# Patient Record
Sex: MALE | Race: OTHER | NOT HISPANIC OR LATINO | URBAN - METROPOLITAN AREA
[De-identification: names, ages, dates, MRNs, and addresses within clinical notes are randomized per-mention and may not be internally consistent; named-entity substitution may affect disease eponyms.]

---

## 2022-12-29 VITALS
OXYGEN SATURATION: 95 % | HEIGHT: 71 IN | WEIGHT: 158.07 LBS | RESPIRATION RATE: 18 BRPM | TEMPERATURE: 98 F | DIASTOLIC BLOOD PRESSURE: 66 MMHG | SYSTOLIC BLOOD PRESSURE: 123 MMHG | HEART RATE: 59 BPM

## 2022-12-29 RX ORDER — CHLORHEXIDINE GLUCONATE 213 G/1000ML
1 SOLUTION TOPICAL ONCE
Refills: 0 | Status: DISCONTINUED | OUTPATIENT
Start: 2023-01-11 | End: 2023-01-25

## 2022-12-29 NOTE — H&P ADULT - NSICDXPASTMEDICALHX_GEN_ALL_CORE_FT
PAST MEDICAL HISTORY:  GERD (gastroesophageal reflux disease)     HLD (hyperlipidemia)     Smoker

## 2022-12-29 NOTE — H&P ADULT - NSICDXFAMILYHX_GEN_ALL_CORE_FT
FAMILY HISTORY:  Father  Still living? Unknown  Family history of premature CAD, Age at diagnosis: Age Unknown    Mother  Still living? Unknown  Family history of premature CAD, Age at diagnosis: Age Unknown

## 2022-12-29 NOTE — H&P ADULT - NSHPPHYSICALEXAM_GEN_ALL_CORE
PHYSICAL EXAM:  General: Awake and alert.  No acute distress.  Head: Normocephalic, atraumatic.    Eyes: PERRL.  EOMI.  No scleral icterus.  No conjunctival pallor.  Mouth: Moist MM.  No oropharyngeal exudates.    Neck: Supple.  Full range of motion.  No JVD.  No LAD.  No thyromegaly.  Trachea midline.  2+ carotid pulses bilaterally, no carotid bruit.  Heart: RRR.  Normal S1 and S2.  No murmurs, rubs, or gallops.  No LE edema b/l.   Lungs: Nonlabored breathing.  Good inspiratory effort.  CTAB.  No wheezes, crackles, or rhonchi.    Abdomen: BS+, soft, NT/ND.  No hepatomegaly.   Skin: Warm and dry.  No rashes.  Extremities: No cyanosis.  2+ peripheral pulses b/l.  Musculoskeletal: No joint deformities.  No spinal or paraspinal tenderness.  Vascular: 2+ radial pulses bilaterally. 2+ DP/PT pulses bilaterally. 2+ femoral pulses bilaterally, no femoral bruit.  Neuro: A&Ox3.  CN II-XII intact.  5/5 motor strength in UE and LE b/l.  Tactile sensation intact in UE and LE b/l.

## 2022-12-29 NOTE — H&P ADULT - HISTORY OF PRESENT ILLNESS
COVID:  Pharmacy:  Escort:    57M w/ PMHx _____, initially presented to Dr. Brumfield c/o __. He denies any __ CP, palpitations, dizziness, syncope, diaphoresis, fatigue, LE edema, SOB, POWELL, orthopnea, PND, N/V/D, abd pain, cough, congestion, fever, chills or recent sick contact.     NST 12/24/22: medium reversible defect of mod intensity in basal-mid inferior/inferolateral walls and small-medium reversible defect of mod intensity in mid-apical anterior/anteroseptal walls, LVEF normal.    In light of pts risk factors, CCS class __ anginal symptoms and abnormal NST, pt now presents to [facility] for recommended cardiac catheterization with possible intervention if clinically indicated.  COVID:  Pharmacy:  Escort:    **[facility] - OFFICE WILL FAX NOTE AND TTE ON 12/30/22**    57M w/ PMHx _____, initially presented to Dr. Brumfield c/o __. He denies any __ CP, palpitations, dizziness, syncope, diaphoresis, fatigue, LE edema, SOB, POWELL, orthopnea, PND, N/V/D, abd pain, cough, congestion, fever, chills or recent sick contact.     NST 12/24/22: medium reversible defect of mod intensity in basal-mid inferior/inferolateral walls and small-medium reversible defect of mod intensity in mid-apical anterior/anteroseptal walls, LVEF normal. TTE ___:    In light of pts risk factors, CCS class __ anginal symptoms and abnormal NST, pt now presents to [facility] for recommended cardiac catheterization with possible intervention if clinically indicated.  COVID:  Pharmacy:  Escort:    **[facility] - OFFICE WILL FAX NOTE AND TTE ON 1/6**    57M w/ PMHx _____, initially presented to Dr. Brumfield c/o __. He denies any __ CP, palpitations, dizziness, syncope, diaphoresis, fatigue, LE edema, SOB, POWELL, orthopnea, PND, N/V/D, abd pain, cough, congestion, fever, chills or recent sick contact. NST 12/24/22: medium reversible defect of mod intensity in basal-mid inferior/inferolateral walls and small-medium reversible defect of mod intensity in mid-apical anterior/anteroseptal walls, LVEF normal. TTE ___.  In light of pts risk factors, CCS class __ anginal symptoms and abnormal NST, pt now presents to [facility] for recommended cardiac catheterization with possible intervention if clinically indicated. COVID: (-) 1/6/23  Pharmacy:  Escort:    Spoke to patient via phone but patient very poor historian.     57M, active smoker (1PPD x40yrs) w/ FHx of CAD (father MI 58, mother MI 61) w/ PMHx HLD, GERD, and on blood thinner (pt unsure why he takes Eliquis; last dose of Eliquis 1/7/23), initially presented to Dr. Brumfield c/o several episodes of chest pain and SOB upon mild exertion. He denies any palpitations, dizziness, syncope, diaphoresis, fatigue, LE edema, POWELL, orthopnea, PND, N/V/D, abd pain, cough, congestion, fever, chills or recent sick contact. NST 12/24/22: medium reversible defect of mod intensity in basal-mid inferior/inferolateral walls and small-medium reversible defect of mod intensity in mid-apical anterior/anteroseptal walls, LVEF normal. Echo (per MD note) "essentially unremarkable and showed normal LVEF w/ no significant valvular abnormalities.   In light of pts risk factors, CCS class III anginal symptoms and abnormal NST, pt now presents to [facility] for recommended cardiac catheterization with possible intervention if clinically indicated. COVID-19 PCR: negative 1/6/23 (HIE)  Cardiologist: Dr. Ryan Brumfield  Pharmacy: [facility] ([address] [place] [address])  Escort:    58 y/o Male, active smoker (1PPD x40yrs) w/ FHx of CAD (father MI 58, mother MI 61) w/ PMHx of HLD, GERD, and on blood thinner (pt unsure why he takes Eliquis; last dose of Eliquis 1/7/23), initially presented to Dr. Brumfield c/o several episodes of chest pain and SOB upon mild exertion. He denies any palpitations, dizziness, syncope, diaphoresis, fatigue, LE edema, POWELL, orthopnea, PND, N/V/D, abd pain, cough, congestion, fever, chills or recent sick contact. NST 12/24/22: medium reversible defect of mod intensity in basal-mid inferior/inferolateral walls and small-medium reversible defect of mod intensity in mid-apical anterior/anteroseptal walls, LVEF normal. Echo (per MD note) "essentially unremarkable and showed normal LVEF w/ no significant valvular abnormalities." In light of pts risk factors, CCS class III anginal symptoms and abnormal NST, pt now presents to [facility] for recommended cardiac catheterization with possible intervention if clinically indicated. COVID-19 PCR: negative 1/6/23 (HIE)  Cardiologist: Dr. Ryan Brumfield  Pharmacy: [facility] ([address] [place] [address])  Escort: friend    56 y/o Male, POOR HISTORIAN/MEDICATION COMPLIANCE, active smoker (1PPD x40yrs) w/ FHx of CAD (father MI 58, mother MI 61) w/ PMHx of HLD, GERD, ?hx of PE or DVT (dx at [facility], refuses to provide paperwork; on Eliquis, last dose 1/7/23), initially presented to Dr. Brumfield c/o several episodes of chest pain and SOB upon mild exertion. He denies any palpitations, dizziness, syncope, diaphoresis, fatigue, LE edema, POWELL, orthopnea, PND, N/V/D, abd pain, cough, congestion, fever, chills or recent sick contact. NST 12/24/22: medium reversible defect of mod intensity in basal-mid inferior/inferolateral walls and small-medium reversible defect of mod intensity in mid-apical anterior/anteroseptal walls, LVEF normal. Echo (per MD note) "essentially unremarkable and showed normal LVEF w/ no significant valvular abnormalities." In light of pts risk factors, CCS class III anginal symptoms and abnormal NST, pt now presents to [facility] for recommended cardiac catheterization with possible intervention if clinically indicated. COVID-19 PCR: negative 1/6/23 (HIE)  Cardiologist: Dr. Ryan Brumfield  Pharmacy: [facility] ([address] [place] [address]) or [facility] ([address] [place] [phone])  Escort: friend    58 y/o Male, POOR HISTORIAN/MEDICATION COMPLIANCE, active smoker (1PPD x40yrs) w/ FHx of CAD (father MI 58, mother MI 61) w/ PMHx of HLD, GERD, ?hx of PE or DVT (dx at [facility], refuses to provide paperwork; on Eliquis, last dose 1/7/23), initially presented to Dr. Brumfield c/o several episodes of chest pain and SOB upon mild exertion. He denies any palpitations, dizziness, syncope, diaphoresis, fatigue, LE edema, POWELL, orthopnea, PND, N/V/D, abd pain, cough, congestion, fever, chills or recent sick contact. NST 12/24/22: medium reversible defect of mod intensity in basal-mid inferior/inferolateral walls and small-medium reversible defect of mod intensity in mid-apical anterior/anteroseptal walls, LVEF normal. Echo (per MD note) "essentially unremarkable and showed normal LVEF w/ no significant valvular abnormalities." In light of pts risk factors, CCS class III anginal symptoms and abnormal NST, pt now presents to [facility] for recommended cardiac catheterization with possible intervention if clinically indicated.

## 2022-12-29 NOTE — H&P ADULT - ASSESSMENT
58 y/o Male, active smoker (1PPD x40yrs) w/ FHx of CAD (father MI 58, mother MI 61) w/ PMHx of HLD, GERD, and on blood thinner (pt unsure why he takes Eliquis; last dose of Eliquis 1/7/23) presents to [facility] for cardiac catheterization with possible intervention if clinically indicated; in light of pts risk factors, CCS class III anginal symptoms and abnormal NST.    - ASA II, Mallampati III  - H/H __.   - Cr __. EF normal by echo. Euvolemic on exam. NS 250cc bolus followed by 75cc/hr x2 hrs per pre-hydration protocol  - Suitable candidate for moderate sedation  - Pt consented for cath and form placed in chart    Risks & benefits of procedure and alternative therapy have been explained to the patient including but not limited to: allergic reaction, bleeding with possible need for blood transfusion, infection, renal and vascular compromise, limb damage, arrhythmia, stroke, vessel dissection/perforation, myocardial infarction, and emergent CABG. Informed consent obtained at bedside and included in chart. 56 y/o Male, POOR HISTORIAN/MEDICATION COMPLIANCE, active smoker (1PPD x40yrs) w/ FHx of CAD (father MI 58, mother MI 61) w/ PMHx of HLD, GERD, ?hx of PE or DVT (dx at [facility], refuses to provide paperwork; on Eliquis, last dose 1/7/23), presents to [facility] for cardiac catheterization with possible intervention if clinically indicated; in light of pts risk factors, CCS class III anginal symptoms and abnormal NST.    - ASA II, Mallampati III  - H/H 14/43.8. Denies hematuria, BRBPR, melena. Will load w/ ASA 325mg x1 and Plavix 600mg x1  - Cr __. EF normal by echo. Euvolemic on exam. NS 250cc bolus followed by 75cc/hr x2 hrs per pre-hydration protocol  - Suitable candidate for moderate sedation  - Pt consented for cath and form placed in chart    Risks & benefits of procedure and alternative therapy have been explained to the patient including but not limited to: allergic reaction, bleeding with possible need for blood transfusion, infection, renal and vascular compromise, limb damage, arrhythmia, stroke, vessel dissection/perforation, myocardial infarction, and emergent CABG. Informed consent obtained at bedside and included in chart. 56 y/o Male, POOR HISTORIAN/MEDICATION COMPLIANCE, active smoker (1PPD x40yrs) w/ FHx of CAD (father MI 58, mother MI 61) w/ PMHx of HLD, GERD, ?hx of PE or DVT (dx at [facility], refuses to provide paperwork; on Eliquis, last dose 1/7/23), presents to [facility] for cardiac catheterization with possible intervention if clinically indicated; in light of pts risk factors, CCS class III anginal symptoms and abnormal NST.    - ASA II, Mallampati III  - H/H 14/43.8. Denies hematuria, BRBPR, melena. Will load w/ ASA 325mg x1 and Plavix 600mg x1  - Cr 0.71. EF normal by echo. Euvolemic on exam. NS 250cc bolus followed by 75cc/hr x2 hrs per pre-hydration protocol  - Suitable candidate for moderate sedation  - Pt consented for cath and form placed in chart    Risks & benefits of procedure and alternative therapy have been explained to the patient including but not limited to: allergic reaction, bleeding with possible need for blood transfusion, infection, renal and vascular compromise, limb damage, arrhythmia, stroke, vessel dissection/perforation, myocardial infarction, and emergent CABG. Informed consent obtained at bedside and included in chart.

## 2022-12-29 NOTE — H&P ADULT - NSHPLABSRESULTS_GEN_ALL_CORE
Labs:                        14.0   7.43  )-----------( 312      ( 11 Jan 2023 11:56 )             43.8       Blood Gas Venous:  pH: 7.43 | HCO3: 30 | pCO2: 45 | pO2: 49 | Lactate: x (01-11-23 @ 12:27)    EKG: Sinus arrhythmia at 63bpm w/ PVCs Labs:                        14.0   7.43  )-----------( 312      ( 11 Jan 2023 11:56 )             43.8     01-11    139  |  101  |  12  ----------------------------<  109<H>  4.0   |  28  |  0.71    Ca    9.1      11 Jan 2023 11:56  Mg     2.0     01-11    TPro  6.6  /  Alb  4.1  /  TBili  0.3  /  DBili  x   /  AST  16  /  ALT  18  /  AlkPhos  81  01-11    Blood Gas Venous:  pH: 7.43 | HCO3: 30 | pCO2: 45 | pO2: 49 | Lactate: x (01-11-23 @ 12:27)    EKG: Sinus arrhythmia at 63bpm w/ PVCs

## 2023-01-11 ENCOUNTER — OUTPATIENT (OUTPATIENT)
Dept: OUTPATIENT SERVICES | Facility: HOSPITAL | Age: 58
LOS: 1 days | Discharge: HOME | End: 2023-01-11
Payer: COMMERCIAL

## 2023-01-11 LAB
A1C WITH ESTIMATED AVERAGE GLUCOSE RESULT: 6 % — HIGH (ref 4–5.6)
ALBUMIN SERPL ELPH-MCNC: 4.1 G/DL — SIGNIFICANT CHANGE UP (ref 3.3–5)
ALP SERPL-CCNC: 81 U/L — SIGNIFICANT CHANGE UP (ref 40–120)
ALT FLD-CCNC: 18 U/L — SIGNIFICANT CHANGE UP (ref 10–45)
ANION GAP SERPL CALC-SCNC: 10 MMOL/L — SIGNIFICANT CHANGE UP (ref 5–17)
ANION GAP SERPL CALC-SCNC: 7 MMOL/L — SIGNIFICANT CHANGE UP (ref 5–17)
APTT BLD: 33.5 SEC — SIGNIFICANT CHANGE UP (ref 27.5–35.5)
AST SERPL-CCNC: 16 U/L — SIGNIFICANT CHANGE UP (ref 10–40)
BASE EXCESS BLDV CALC-SCNC: 4.7 MMOL/L — HIGH (ref -2–3)
BASOPHILS # BLD AUTO: 0.05 K/UL — SIGNIFICANT CHANGE UP (ref 0–0.2)
BASOPHILS NFR BLD AUTO: 0.7 % — SIGNIFICANT CHANGE UP (ref 0–2)
BILIRUB SERPL-MCNC: 0.3 MG/DL — SIGNIFICANT CHANGE UP (ref 0.2–1.2)
BUN SERPL-MCNC: 10 MG/DL — SIGNIFICANT CHANGE UP (ref 7–23)
BUN SERPL-MCNC: 12 MG/DL — SIGNIFICANT CHANGE UP (ref 7–23)
CA-I SERPL-SCNC: 1.19 MMOL/L — SIGNIFICANT CHANGE UP (ref 1.15–1.33)
CALCIUM SERPL-MCNC: 8.5 MG/DL — SIGNIFICANT CHANGE UP (ref 8.4–10.5)
CALCIUM SERPL-MCNC: 9.1 MG/DL — SIGNIFICANT CHANGE UP (ref 8.4–10.5)
CHLORIDE SERPL-SCNC: 101 MMOL/L — SIGNIFICANT CHANGE UP (ref 96–108)
CHLORIDE SERPL-SCNC: 104 MMOL/L — SIGNIFICANT CHANGE UP (ref 96–108)
CHOLEST SERPL-MCNC: 203 MG/DL — HIGH
CK MB CFR SERPL CALC: 1.2 NG/ML — SIGNIFICANT CHANGE UP (ref 0–6.7)
CK SERPL-CCNC: 43 U/L — SIGNIFICANT CHANGE UP (ref 30–200)
CO2 BLDV-SCNC: 31.3 MMOL/L — HIGH (ref 22–26)
CO2 SERPL-SCNC: 28 MMOL/L — SIGNIFICANT CHANGE UP (ref 22–31)
CO2 SERPL-SCNC: 29 MMOL/L — SIGNIFICANT CHANGE UP (ref 22–31)
COHGB MFR BLDV: 4.9 % — HIGH
CREAT SERPL-MCNC: 0.71 MG/DL — SIGNIFICANT CHANGE UP (ref 0.5–1.3)
CREAT SERPL-MCNC: 0.72 MG/DL — SIGNIFICANT CHANGE UP (ref 0.5–1.3)
EGFR: 107 ML/MIN/1.73M2 — SIGNIFICANT CHANGE UP
EGFR: 107 ML/MIN/1.73M2 — SIGNIFICANT CHANGE UP
EOSINOPHIL # BLD AUTO: 0.11 K/UL — SIGNIFICANT CHANGE UP (ref 0–0.5)
EOSINOPHIL NFR BLD AUTO: 1.5 % — SIGNIFICANT CHANGE UP (ref 0–6)
ESTIMATED AVERAGE GLUCOSE: 126 MG/DL — HIGH (ref 68–114)
GAS PNL BLDV: 138 MMOL/L — SIGNIFICANT CHANGE UP (ref 136–145)
GLUCOSE BLDV-MCNC: 107 MG/DL — HIGH (ref 70–99)
GLUCOSE SERPL-MCNC: 109 MG/DL — HIGH (ref 70–99)
GLUCOSE SERPL-MCNC: 92 MG/DL — SIGNIFICANT CHANGE UP (ref 70–99)
HCO3 BLDV-SCNC: 30 MMOL/L — HIGH (ref 22–29)
HCT VFR BLD CALC: 42 % — SIGNIFICANT CHANGE UP (ref 39–50)
HCT VFR BLD CALC: 43.8 % — SIGNIFICANT CHANGE UP (ref 39–50)
HCV AB S/CO SERPL IA: 0.04 S/CO — SIGNIFICANT CHANGE UP
HCV AB SERPL-IMP: SIGNIFICANT CHANGE UP
HDLC SERPL-MCNC: 44 MG/DL — SIGNIFICANT CHANGE UP
HGB BLD CALC-MCNC: 14.5 G/DL — SIGNIFICANT CHANGE UP (ref 12.6–17.4)
HGB BLD-MCNC: 13.6 G/DL — SIGNIFICANT CHANGE UP (ref 13–17)
HGB BLD-MCNC: 14 G/DL — SIGNIFICANT CHANGE UP (ref 13–17)
IMM GRANULOCYTES NFR BLD AUTO: 0.4 % — SIGNIFICANT CHANGE UP (ref 0–0.9)
INR BLD: 0.98 — SIGNIFICANT CHANGE UP (ref 0.88–1.16)
LIPID PNL WITH DIRECT LDL SERPL: 140 MG/DL — HIGH
LYMPHOCYTES # BLD AUTO: 1.91 K/UL — SIGNIFICANT CHANGE UP (ref 1–3.3)
LYMPHOCYTES # BLD AUTO: 25.7 % — SIGNIFICANT CHANGE UP (ref 13–44)
MAGNESIUM SERPL-MCNC: 2 MG/DL — SIGNIFICANT CHANGE UP (ref 1.6–2.6)
MAGNESIUM SERPL-MCNC: 2 MG/DL — SIGNIFICANT CHANGE UP (ref 1.6–2.6)
MCHC RBC-ENTMCNC: 27.3 PG — SIGNIFICANT CHANGE UP (ref 27–34)
MCHC RBC-ENTMCNC: 28.2 PG — SIGNIFICANT CHANGE UP (ref 27–34)
MCHC RBC-ENTMCNC: 32 GM/DL — SIGNIFICANT CHANGE UP (ref 32–36)
MCHC RBC-ENTMCNC: 32.4 GM/DL — SIGNIFICANT CHANGE UP (ref 32–36)
MCV RBC AUTO: 85.5 FL — SIGNIFICANT CHANGE UP (ref 80–100)
MCV RBC AUTO: 87 FL — SIGNIFICANT CHANGE UP (ref 80–100)
METHGB MFR BLDV: 0.5 % — SIGNIFICANT CHANGE UP
MONOCYTES # BLD AUTO: 0.74 K/UL — SIGNIFICANT CHANGE UP (ref 0–0.9)
MONOCYTES NFR BLD AUTO: 10 % — SIGNIFICANT CHANGE UP (ref 2–14)
NEUTROPHILS # BLD AUTO: 4.59 K/UL — SIGNIFICANT CHANGE UP (ref 1.8–7.4)
NEUTROPHILS NFR BLD AUTO: 61.7 % — SIGNIFICANT CHANGE UP (ref 43–77)
NON HDL CHOLESTEROL: 159 MG/DL — HIGH
NRBC # BLD: 0 /100 WBCS — SIGNIFICANT CHANGE UP (ref 0–0)
NRBC # BLD: 0 /100 WBCS — SIGNIFICANT CHANGE UP (ref 0–0)
PCO2 BLDV: 45 MMHG — SIGNIFICANT CHANGE UP (ref 42–55)
PH BLDV: 7.43 — SIGNIFICANT CHANGE UP (ref 7.32–7.43)
PLATELET # BLD AUTO: 305 K/UL — SIGNIFICANT CHANGE UP (ref 150–400)
PLATELET # BLD AUTO: 312 K/UL — SIGNIFICANT CHANGE UP (ref 150–400)
PO2 BLDV: 49 MMHG — HIGH (ref 25–45)
POTASSIUM BLDV-SCNC: 4 MMOL/L — SIGNIFICANT CHANGE UP (ref 3.5–5.1)
POTASSIUM SERPL-MCNC: 4 MMOL/L — SIGNIFICANT CHANGE UP (ref 3.5–5.3)
POTASSIUM SERPL-MCNC: 4.2 MMOL/L — SIGNIFICANT CHANGE UP (ref 3.5–5.3)
POTASSIUM SERPL-SCNC: 4 MMOL/L — SIGNIFICANT CHANGE UP (ref 3.5–5.3)
POTASSIUM SERPL-SCNC: 4.2 MMOL/L — SIGNIFICANT CHANGE UP (ref 3.5–5.3)
PROT SERPL-MCNC: 6.6 G/DL — SIGNIFICANT CHANGE UP (ref 6–8.3)
PROTHROM AB SERPL-ACNC: 11.6 SEC — SIGNIFICANT CHANGE UP (ref 10.5–13.4)
RBC # BLD: 4.83 M/UL — SIGNIFICANT CHANGE UP (ref 4.2–5.8)
RBC # BLD: 5.12 M/UL — SIGNIFICANT CHANGE UP (ref 4.2–5.8)
RBC # FLD: 13.2 % — SIGNIFICANT CHANGE UP (ref 10.3–14.5)
RBC # FLD: 13.2 % — SIGNIFICANT CHANGE UP (ref 10.3–14.5)
SAO2 % BLDV: 85 % — SIGNIFICANT CHANGE UP (ref 67–88)
SODIUM SERPL-SCNC: 139 MMOL/L — SIGNIFICANT CHANGE UP (ref 135–145)
SODIUM SERPL-SCNC: 140 MMOL/L — SIGNIFICANT CHANGE UP (ref 135–145)
TRIGL SERPL-MCNC: 96 MG/DL — SIGNIFICANT CHANGE UP
WBC # BLD: 7.41 K/UL — SIGNIFICANT CHANGE UP (ref 3.8–10.5)
WBC # BLD: 7.43 K/UL — SIGNIFICANT CHANGE UP (ref 3.8–10.5)
WBC # FLD AUTO: 7.41 K/UL — SIGNIFICANT CHANGE UP (ref 3.8–10.5)
WBC # FLD AUTO: 7.43 K/UL — SIGNIFICANT CHANGE UP (ref 3.8–10.5)

## 2023-01-11 PROCEDURE — C1874: CPT

## 2023-01-11 PROCEDURE — 99152 MOD SED SAME PHYS/QHP 5/>YRS: CPT

## 2023-01-11 PROCEDURE — 99153 MOD SED SAME PHYS/QHP EA: CPT

## 2023-01-11 PROCEDURE — 36415 COLL VENOUS BLD VENIPUNCTURE: CPT

## 2023-01-11 PROCEDURE — 86803 HEPATITIS C AB TEST: CPT

## 2023-01-11 PROCEDURE — C1887: CPT

## 2023-01-11 PROCEDURE — 85027 COMPLETE CBC AUTOMATED: CPT

## 2023-01-11 PROCEDURE — C1769: CPT

## 2023-01-11 PROCEDURE — 85730 THROMBOPLASTIN TIME PARTIAL: CPT

## 2023-01-11 PROCEDURE — C1725: CPT

## 2023-01-11 PROCEDURE — 80048 BASIC METABOLIC PNL TOTAL CA: CPT

## 2023-01-11 PROCEDURE — 93458 L HRT ARTERY/VENTRICLE ANGIO: CPT | Mod: 59

## 2023-01-11 PROCEDURE — 82565 ASSAY OF CREATININE: CPT

## 2023-01-11 PROCEDURE — 82803 BLOOD GASES ANY COMBINATION: CPT

## 2023-01-11 PROCEDURE — C9600: CPT | Mod: LD

## 2023-01-11 PROCEDURE — 80061 LIPID PANEL: CPT

## 2023-01-11 PROCEDURE — 80053 COMPREHEN METABOLIC PANEL: CPT

## 2023-01-11 PROCEDURE — 85025 COMPLETE CBC W/AUTO DIFF WBC: CPT

## 2023-01-11 PROCEDURE — 82553 CREATINE MB FRACTION: CPT

## 2023-01-11 PROCEDURE — 93005 ELECTROCARDIOGRAM TRACING: CPT

## 2023-01-11 PROCEDURE — 93010 ELECTROCARDIOGRAM REPORT: CPT | Mod: 76

## 2023-01-11 PROCEDURE — 85347 COAGULATION TIME ACTIVATED: CPT

## 2023-01-11 PROCEDURE — 83735 ASSAY OF MAGNESIUM: CPT

## 2023-01-11 PROCEDURE — 83036 HEMOGLOBIN GLYCOSYLATED A1C: CPT

## 2023-01-11 PROCEDURE — 92928 PRQ TCAT PLMT NTRAC ST 1 LES: CPT | Mod: LD

## 2023-01-11 PROCEDURE — 93458 L HRT ARTERY/VENTRICLE ANGIO: CPT | Mod: 26,59

## 2023-01-11 PROCEDURE — 85610 PROTHROMBIN TIME: CPT

## 2023-01-11 PROCEDURE — 82550 ASSAY OF CK (CPK): CPT

## 2023-01-11 PROCEDURE — C1894: CPT

## 2023-01-11 RX ORDER — APIXABAN 2.5 MG/1
1 TABLET, FILM COATED ORAL
Qty: 0 | Refills: 0 | DISCHARGE

## 2023-01-11 RX ORDER — DONEPEZIL HYDROCHLORIDE 10 MG/1
1 TABLET, FILM COATED ORAL
Qty: 0 | Refills: 0 | DISCHARGE

## 2023-01-11 RX ORDER — SIMVASTATIN 20 MG/1
1 TABLET, FILM COATED ORAL
Qty: 0 | Refills: 0 | DISCHARGE

## 2023-01-11 RX ORDER — ASPIRIN/CALCIUM CARB/MAGNESIUM 324 MG
325 TABLET ORAL ONCE
Refills: 0 | Status: COMPLETED | OUTPATIENT
Start: 2023-01-11 | End: 2023-01-11

## 2023-01-11 RX ORDER — CLOPIDOGREL BISULFATE 75 MG/1
600 TABLET, FILM COATED ORAL ONCE
Refills: 0 | Status: COMPLETED | OUTPATIENT
Start: 2023-01-11 | End: 2023-01-11

## 2023-01-11 RX ORDER — SODIUM CHLORIDE 9 MG/ML
250 INJECTION INTRAMUSCULAR; INTRAVENOUS; SUBCUTANEOUS ONCE
Refills: 0 | Status: COMPLETED | OUTPATIENT
Start: 2023-01-11 | End: 2023-01-11

## 2023-01-11 RX ORDER — ASPIRIN/CALCIUM CARB/MAGNESIUM 324 MG
1 TABLET ORAL
Qty: 30 | Refills: 11
Start: 2023-01-11 | End: 2024-01-05

## 2023-01-11 RX ORDER — CLOPIDOGREL BISULFATE 75 MG/1
1 TABLET, FILM COATED ORAL
Qty: 30 | Refills: 11
Start: 2023-01-11 | End: 2024-01-05

## 2023-01-11 RX ORDER — ASPIRIN/CALCIUM CARB/MAGNESIUM 324 MG
1 TABLET ORAL
Qty: 0 | Refills: 0 | DISCHARGE

## 2023-01-11 RX ORDER — NICOTINE POLACRILEX 2 MG
1 GUM BUCCAL
Qty: 0 | Refills: 0 | DISCHARGE

## 2023-01-11 RX ORDER — ALBUTEROL 90 UG/1
2 AEROSOL, METERED ORAL
Qty: 0 | Refills: 0 | DISCHARGE

## 2023-01-11 RX ORDER — OMEPRAZOLE 10 MG/1
1 CAPSULE, DELAYED RELEASE ORAL
Qty: 0 | Refills: 0 | DISCHARGE

## 2023-01-11 RX ORDER — SODIUM CHLORIDE 9 MG/ML
500 INJECTION INTRAMUSCULAR; INTRAVENOUS; SUBCUTANEOUS
Refills: 0 | Status: DISCONTINUED | OUTPATIENT
Start: 2023-01-11 | End: 2023-01-25

## 2023-01-11 RX ADMIN — SODIUM CHLORIDE 140 MILLILITER(S): 9 INJECTION INTRAMUSCULAR; INTRAVENOUS; SUBCUTANEOUS at 15:10

## 2023-01-11 RX ADMIN — CLOPIDOGREL BISULFATE 600 MILLIGRAM(S): 75 TABLET, FILM COATED ORAL at 12:55

## 2023-01-11 RX ADMIN — Medication 325 MILLIGRAM(S): at 12:54

## 2023-01-11 RX ADMIN — SODIUM CHLORIDE 75 MILLILITER(S): 9 INJECTION INTRAMUSCULAR; INTRAVENOUS; SUBCUTANEOUS at 12:55

## 2023-01-11 RX ADMIN — SODIUM CHLORIDE 500 MILLILITER(S): 9 INJECTION INTRAMUSCULAR; INTRAVENOUS; SUBCUTANEOUS at 12:56

## 2023-01-11 NOTE — PROGRESS NOTE ADULT - SUBJECTIVE AND OBJECTIVE BOX
Interventional Cardiology PA Post PCI SDA Discharge Note    Patient without complaints. Ambulated and voided without difficulties    Afebrile, VSS    PRESCRIPTIONS/HOME MEDICATIONS:  Albuterol (Eqv-ProAir HFA) 90 mcg/inh inhalation aerosol: 2 puff(s) inhaled every 6 hours  busPIRone 10 mg oral tablet: 1 tab(s) orally 2 times a day  Eliquis 5 mg oral tablet: 1 tab(s) orally 2 times a day  nicotine 21 mg/24 hr transdermal film, extended release: 1 patch transdermal once a day  omeprazole 40 mg oral delayed release capsule: 1 cap(s) orally once a day  simvastatin 20 mg oral tablet: 1 tab(s) orally once a day (at bedtime)    CURRENT MEDICATIONS:   chlorhexidine 4% Liquid 1 Application(s) Topical once  sodium chloride 0.9%. 500 milliLiter(s) IV Continuous <Continuous>  sodium chloride 0.9%. 500 milliLiter(s) IV Continuous <Continuous>    Ext: Right Radial: No hematoma, no bleeding, dressing; C/D/I  Pulses:    intact RAD to baseline     A/P:    58 y/o Male, POOR HISTORIAN/MEDICATION COMPLIANCE, active smoker (1PPD x40yrs) w/ FHx of CAD (father MI 58, mother MI 61) w/ PMHx of HLD, GERD, ?hx of PE or DVT (dx at [facility], refuses to provide paperwork; on Eliquis, last dose 1/7/23), initially presented to Dr. Brumfield c/o several episodes of chest pain and SOB upon mild exertion. He denies any palpitations, dizziness, syncope, diaphoresis, fatigue, LE edema, POWELL, orthopnea, PND, N/V/D, abd pain, cough, congestion, fever, chills or recent sick contact. NST 12/24/22: medium reversible defect of mod intensity in basal-mid inferior/inferolateral walls and small-medium reversible defect of mod intensity in mid-apical anterior/anteroseptal walls, LVEF normal. Echo (per MD note) "essentially unremarkable and showed normal LVEF w/ no significant valvular abnormalities." In light of pts risk factors, CCS class III anginal symptoms and abnormal NST, pt presented to [facility] for recommended cardiac catheterization with possible intervention if clinically indicated.    s/p PCI 1/11/23: FROYLAN x1 to D1 (80%). R radial access. No EDP obtained.     1. Follow-up with PMD/Cardiologist Brissa in 72 hours.  2. Post procedure labs/EKG reviewed and stable.    3. Pt given instructions on importance of taking antiplatelet medication.    4. Stable for discharge as per attending  _________ after bed rest, pt voids, groin/wrist stable and 30 minutes of ambulation.  5. Prescriptions for Aspirin/Plavix e-prescribed and submitted to patient's pharmacy. Patient on Eliquis for ?PE/DVT. Should be on triple therapy for 3 month  6. Patient will continue/Start Statin (Name and dose).  No Statin Prescribed due to ____________.  7. Patient will continue All Other Home Medications.  (PLEASE NOTE IF ANY CHANGES).  8. Is patient a Current Smoker: Yes/No?  If yes, Patient was Counseled on importance of smoking cessation.   9. Discharge forms signed and copies in chart   10. Discharge Order Entered Yes/No ________.  11. Patient educated on benefits of Cardiac rehab, prescription provided, and patient given list of locations.  Patient was instructed to contact their insurance company to review list of participating providers and to bring prescription with them to  their follow-up Cardiology appointment for assistance with enrollment into program.                *** Reasons for No Cardiac Rehab Referral Rx (Document 1 or more options):                Patient Refused            Medical Reason: ex has Home Care, Home PT            Patient lacks medical coverage for Cardiac Rehab            Pt discharged to Nursing Care/[facility]/Long term Care Facility            Patient Lacks Transportation or no cardiac rehab within 60 minutes driving range            Patient already participates in Cardiac Rehab            Other: (provide details) ex: Hospice patient        Interventional Cardiology PA Post PCI SDA Discharge Note    Patient without complaints. Ambulated and voided without difficulties    Afebrile, VSS    PRESCRIPTIONS/HOME MEDICATIONS:  Albuterol (Eqv-ProAir HFA) 90 mcg/inh inhalation aerosol: 2 puff(s) inhaled every 6 hours  busPIRone 10 mg oral tablet: 1 tab(s) orally 2 times a day  Eliquis 5 mg oral tablet: 1 tab(s) orally 2 times a day  nicotine 21 mg/24 hr transdermal film, extended release: 1 patch transdermal once a day  omeprazole 40 mg oral delayed release capsule: 1 cap(s) orally once a day  simvastatin 20 mg oral tablet: 1 tab(s) orally once a day (at bedtime)    CURRENT MEDICATIONS:   chlorhexidine 4% Liquid 1 Application(s) Topical once  sodium chloride 0.9%. 500 milliLiter(s) IV Continuous <Continuous>  sodium chloride 0.9%. 500 milliLiter(s) IV Continuous <Continuous>    Ext: Right Radial: No hematoma, no bleeding, dressing; C/D/I  Pulses:    intact RAD to baseline     A/P:    56 y/o Male, POOR HISTORIAN/MEDICATION COMPLIANCE, active smoker (1PPD x40yrs) w/ FHx of CAD (father MI 58, mother MI 61) w/ PMHx of HLD, GERD, ?hx of PE or DVT (dx at [facility], refuses to provide paperwork; on Eliquis, last dose 1/7/23), initially presented to Dr. Brumfield c/o several episodes of chest pain and SOB upon mild exertion. He denies any palpitations, dizziness, syncope, diaphoresis, fatigue, LE edema, POWELL, orthopnea, PND, N/V/D, abd pain, cough, congestion, fever, chills or recent sick contact. NST 12/24/22: medium reversible defect of mod intensity in basal-mid inferior/inferolateral walls and small-medium reversible defect of mod intensity in mid-apical anterior/anteroseptal walls, LVEF normal. Echo (per MD note) "essentially unremarkable and showed normal LVEF w/ no significant valvular abnormalities." In light of pts risk factors, CCS class III anginal symptoms and abnormal NST, pt presented to [facility] for recommended cardiac catheterization with possible intervention if clinically indicated.    s/p PCI 1/11/23: FROYLAN x1 to D1 (80%). R radial access. No EDP obtained.     1. Follow-up with PMD/Cardiologist Brissa in 72 hours.  2. Post procedure labs/EKG reviewed and stable.    3. Pt given instructions on importance of taking antiplatelet medication.    4. Stable for discharge as per attending Dr. Brumfield after bed rest, pt voids, wrist stable and 30 minutes of ambulation.  5. Prescriptions for Aspirin/Plavix e-prescribed and submitted to patient's pharmacy. Patient on Eliquis for ?PE/DVT. Patient instructed to continue triple therapy for 2 weeks then to discontinue the ASA.   6. Patient will continue Simvastatin 20mg.    7. Patient will continue All Other Home Medications.    8. Is patient a Current Smoker: Yes. Patient was Counseled on importance of smoking cessation.   9. Discharge forms signed and copies in chart   10. Discharge Order Entered Yes.  11. Patient educated on benefits of Cardiac rehab, prescription provided, and patient given list of locations.  Patient was instructed to contact their insurance company to review list of participating providers and to bring prescription with them to  their follow-up Cardiology appointment for assistance with enrollment into program.                *** Reasons for No Cardiac Rehab Referral Rx (Document 1 or more options): Patient Refused

## 2023-01-13 DIAGNOSIS — I25.110 ATHEROSCLEROTIC HEART DISEASE OF NATIVE CORONARY ARTERY WITH UNSTABLE ANGINA PECTORIS: ICD-10-CM

## 2023-01-13 DIAGNOSIS — R94.39 ABNORMAL RESULT OF OTHER CARDIOVASCULAR FUNCTION STUDY: ICD-10-CM

## 2023-01-13 LAB
ISTAT INR: 1.1 — SIGNIFICANT CHANGE UP (ref 0.88–1.16)
ISTAT PT: 13.3 SEC — HIGH (ref 10–12.9)
POCT ISTAT CREATININE: 0.7 MG/DL — SIGNIFICANT CHANGE UP (ref 0.5–1.3)

## 2023-01-19 LAB — ISTAT ACTK (ACTIVATED CLOTTING TIME KAOLIN): 311 SEC — HIGH (ref 74–137)
